# Patient Record
Sex: FEMALE | Race: BLACK OR AFRICAN AMERICAN | Employment: UNEMPLOYED | URBAN - METROPOLITAN AREA
[De-identification: names, ages, dates, MRNs, and addresses within clinical notes are randomized per-mention and may not be internally consistent; named-entity substitution may affect disease eponyms.]

---

## 2017-05-07 ENCOUNTER — HOSPITAL ENCOUNTER (EMERGENCY)
Age: 47
Discharge: HOME | End: 2017-05-08
Attending: STUDENT IN AN ORGANIZED HEALTH CARE EDUCATION/TRAINING PROGRAM
Payer: SELF-PAY

## 2017-05-07 DIAGNOSIS — M67.432 GANGLION CYST OF WRIST, LEFT: Primary | ICD-10-CM

## 2017-05-07 PROCEDURE — 99283 EMERGENCY DEPT VISIT LOW MDM: CPT | Performed by: PHYSICIAN ASSISTANT

## 2017-05-07 RX ORDER — INDOMETHACIN 25 MG/1
25 CAPSULE ORAL 3 TIMES DAILY
COMMUNITY

## 2017-05-08 VITALS
SYSTOLIC BLOOD PRESSURE: 155 MMHG | HEART RATE: 63 BPM | WEIGHT: 207 LBS | TEMPERATURE: 98.8 F | HEIGHT: 63 IN | DIASTOLIC BLOOD PRESSURE: 99 MMHG | RESPIRATION RATE: 18 BRPM | BODY MASS INDEX: 36.68 KG/M2 | OXYGEN SATURATION: 98 %

## 2017-05-08 RX ORDER — HYDROCODONE BITARTRATE AND ACETAMINOPHEN 5; 325 MG/1; MG/1
1 TABLET ORAL
Qty: 12 TAB | Refills: 0 | Status: SHIPPED | OUTPATIENT
Start: 2017-05-08

## 2017-05-08 RX ORDER — PREDNISONE 10 MG/1
TABLET ORAL
Qty: 48 TAB | Refills: 0 | Status: SHIPPED | OUTPATIENT
Start: 2017-05-08

## 2017-05-08 NOTE — DISCHARGE INSTRUCTIONS
Ganglions: Care Instructions  Your Care Instructions    A ganglion is a small sac, or cyst, filled with a clear fluid that is like jelly. A ganglion may look like a bump on the hand or wrist. It also can appear on your feet, ankles, knees, or shoulders. It is not cancer. A ganglion can grow out of the protective area, or capsule, around a joint. It also can grow on a tendon sheath, which covers the ropelike tendons that connect muscle to bone. A ganglion may hurt or cause numbness if it presses on a nerve. Many ganglions do not need treatment, and they often go away on their own. But if a ganglion hurts, becomes larger, causes numbness, or limits your activity, your doctor may want to drain it with a needle and syringe or remove it with minor surgery. Follow-up care is a key part of your treatment and safety. Be sure to make and go to all appointments, and call your doctor if you are having problems. It's also a good idea to know your test results and keep a list of the medicines you take. How can you care for yourself at home? · Wear a wrist or finger splint as directed by your doctor. It will keep your wrist or hand from moving and help reduce the fluid in the cyst. This may be all you need for the ganglion to shrink and go away. · Do not smash a ganglion with a book or other heavy object. You may break a bone or otherwise injure your wrist by trying this folk remedy, and the ganglion may return anyway. · Do not try to drain the fluid by poking the ganglion with a pin or any other sharp object. You could cause an infection. When should you call for help? Call your doctor now or seek immediate medical care if:  · You have signs of infection, such as:  ¨ Increased pain, swelling, warmth, or redness. ¨ Red streaks leading from the cyst.  ¨ Pus draining from the cyst.  ¨ A fever. Watch closely for changes in your health, and be sure to contact your doctor if:  · You have increasing pain.   · Your ganglion is getting larger. · You still have pain or numbness from a ganglion. Where can you learn more? Go to http://josh-dorothea.info/. Enter L126 in the search box to learn more about \"Ganglions: Care Instructions. \"  Current as of: May 23, 2016  Content Version: 11.2  © 9359-8306 [employer]. Care instructions adapted under license by [employer] (which disclaims liability or warranty for this information). If you have questions about a medical condition or this instruction, always ask your healthcare professional. [place] [address] any warranty or liability for your use of this information.

## 2017-05-08 NOTE — ED PROVIDER NOTES
HPI Comments: 51-year-old -American female presents with painful lump to left wrist which she states she noticed yesterday. Patient states that she has a history of gout. She states that she started taking Indocin because she thought maybe this was related to her gout. Patient is a 55 y.o. female presenting with hand pain. The history is provided by the patient. Hand Pain    This is a new problem. The current episode started yesterday. The problem occurs constantly. The problem has not changed since onset. The pain is present in the left wrist. The quality of the pain is described as sharp and intermittent. The pain is at a severity of 7/10. Pertinent negatives include no numbness, full range of motion, no stiffness and no tingling. The symptoms are aggravated by movement and palpation. She has tried nothing for the symptoms. There has been no history of extremity trauma. Past Medical History:   Diagnosis Date    Ill-defined condition     gout       Past Surgical History:   Procedure Laterality Date    HX GYN      tubal     HX HEENT           History reviewed. No pertinent family history. Social History     Social History    Marital status:      Spouse name: N/A    Number of children: N/A    Years of education: N/A     Occupational History    Not on file. Social History Main Topics    Smoking status: Current Every Day Smoker     Packs/day: 0.25    Smokeless tobacco: Not on file    Alcohol use No    Drug use: Not on file    Sexual activity: Not on file     Other Topics Concern    Not on file     Social History Narrative         ALLERGIES: Review of patient's allergies indicates no known allergies. Review of Systems   Constitutional: Negative for chills, diaphoresis, fatigue and fever. Gastrointestinal: Negative for nausea and vomiting. Musculoskeletal: Positive for arthralgias. Negative for joint swelling, myalgias and stiffness. Skin: Negative.     Neurological: Negative for tingling and numbness. All other systems reviewed and are negative. Vitals:    05/07/17 2047   BP: 158/78   Pulse: 78   Resp: 20   Temp: 97.8 °F (36.6 °C)   SpO2: 98%   Weight: 93.9 kg (207 lb)   Height: 5' 3\" (1.6 m)            Physical Exam   Constitutional: She is oriented to person, place, and time. Vital signs are normal. She is active. Non-toxic appearance. She does not appear ill. No distress. Patient is obese. She is lying on exam stretcher and appears to be in mild discomfort. Cardiovascular:   Pulses:       Radial pulses are 2+ on the right side, and 2+ on the left side. Musculoskeletal:        Left elbow: Normal. She exhibits normal range of motion. No tenderness found. Left wrist: She exhibits decreased range of motion, tenderness and swelling. She exhibits no crepitus and no deformity. Arms:       Left hand: Normal. She exhibits normal range of motion, normal capillary refill and no swelling. Normal sensation noted. Normal strength noted. Neurological: She is alert and oriented to person, place, and time. She has normal strength. No sensory deficit. Skin: Skin is warm and dry. Psychiatric: She has a normal mood and affect. Her behavior is normal.   Nursing note and vitals reviewed. MDM  Number of Diagnoses or Management Options  Ganglion cyst of wrist, left: new and does not require workup  Diagnosis management comments: Ganglion cyst.  No other identifiable gout observed left hand or left wrist.  I reassured the patient this was fairly benign, however she continues to have pain she we'll contact [facility] orth[facility]ics for follow-up. She is prescribed a tapering dose of steroids as well as Norco for pain.     Risk of Complications, Morbidity, and/or Mortality  Presenting problems: low  Management options: low    Patient Progress  Patient progress: stable    ED Course       Procedures

## 2025-01-26 ENCOUNTER — HOSPITAL ENCOUNTER (EMERGENCY)
Age: 55
Discharge: HOME | End: 2025-01-26

## 2025-01-26 VITALS
RESPIRATION RATE: 21 BRPM | SYSTOLIC BLOOD PRESSURE: 152 MMHG | TEMPERATURE: 100.2 F | HEIGHT: 63 IN | OXYGEN SATURATION: 98 % | HEART RATE: 93 BPM | BODY MASS INDEX: 30.12 KG/M2 | WEIGHT: 170 LBS | DIASTOLIC BLOOD PRESSURE: 88 MMHG

## 2025-01-26 DIAGNOSIS — J10.1 INFLUENZA A: Primary | ICD-10-CM

## 2025-01-26 LAB
FLUAV RNA SPEC QL NAA+PROBE: DETECTED
FLUBV RNA SPEC QL NAA+PROBE: NOT DETECTED
SARS-COV-2 RDRP RESP QL NAA+PROBE: NOT DETECTED
SOURCE: NORMAL

## 2025-01-26 PROCEDURE — 87502 INFLUENZA DNA AMP PROBE: CPT

## 2025-01-26 PROCEDURE — 87635 SARS-COV-2 COVID-19 AMP PRB: CPT

## 2025-01-26 PROCEDURE — 99283 EMERGENCY DEPT VISIT LOW MDM: CPT

## 2025-01-26 RX ORDER — OSELTAMIVIR PHOSPHATE 75 MG/1
75 CAPSULE ORAL 2 TIMES DAILY
Qty: 10 CAPSULE | Refills: 0 | Status: SHIPPED | OUTPATIENT
Start: 2025-01-26 | End: 2025-01-31

## 2025-01-26 ASSESSMENT — PAIN SCALES - GENERAL
PAINLEVEL_OUTOF10: 9
PAINLEVEL_OUTOF10: 9

## 2025-01-26 ASSESSMENT — PAIN - FUNCTIONAL ASSESSMENT
PAIN_FUNCTIONAL_ASSESSMENT: 0-10
PAIN_FUNCTIONAL_ASSESSMENT: 0-10

## 2025-01-26 ASSESSMENT — LIFESTYLE VARIABLES
HOW OFTEN DO YOU HAVE A DRINK CONTAINING ALCOHOL: NEVER
HOW MANY STANDARD DRINKS CONTAINING ALCOHOL DO YOU HAVE ON A TYPICAL DAY: PATIENT DOES NOT DRINK

## 2025-01-26 NOTE — ED PROVIDER NOTES
Emergency Department Provider Note       PCP: None, None   Age: 54 y.o.   Sex: female     DISPOSITION Decision To Discharge 01/26/2025 11:43:06 AM    ICD-10-CM    1. Influenza A  J10.1           Medical Decision Making     54-year-old female ambulatory to the emergency department complaint of nasal congestion.  Influenza A+.  Discharged with Tamiflu.  She is not hypoxic with no respiratory distress.  Safe for discharge at this time     1 or more acute illnesses that pose a threat to life or bodily function.   Prescription drug management performed.  Patient was discharged risks and benefits of hospitalization were considered.  Shared medical decision making was utilized in creating the patients health plan today.  I independently ordered and reviewed each unique test.    I reviewed external records: ED visit note from a different ED.   I reviewed external records: provider visit note from PCP.                     History     54-year-old female presents with nasal congestion cough and bodyaches.  States this started within the last 48 hours.  She has had no nausea vomiting diarrhea.    The history is provided by the patient.       ROS     Review of Systems   Constitutional:  Negative for chills, fatigue and fever.   HENT:  Positive for congestion, sinus pressure and sinus pain.    Respiratory:  Negative for chest tightness and shortness of breath.    Cardiovascular:  Negative for chest pain.   Gastrointestinal:  Negative for abdominal pain, diarrhea, nausea and vomiting.   All other systems reviewed and are negative.       Physical Exam     Vitals signs and nursing note reviewed:  Vitals:    01/26/25 1052   BP: (!) 156/96   Pulse: 99   Resp: 21   Temp: 100.2 °F (37.9 °C)   TempSrc: Oral   SpO2: 98%   Weight: 77.1 kg (170 lb)   Height: 1.6 m (5' 3\")      Physical Exam  Vitals and nursing note reviewed.   Constitutional:       Appearance: Normal appearance.   HENT:      Head: Normocephalic and atraumatic.      Nose:

## 2025-01-26 NOTE — DISCHARGE INSTRUCTIONS
Use coricidin HBP for congestion and cough.  The pharmacist can help you locate this medication.  Other decongestants may cause your blood pressure to elevate.  Start Tamiflu twice daily for the next 5 days.  Tylenol and ibuprofen as needed for fever or bodyaches.